# Patient Record
(demographics unavailable — no encounter records)

---

## 2024-12-17 NOTE — HISTORY OF PRESENT ILLNESS
[FreeTextEntry1] : 47F with PMH of T2DM on oral meds (A1c 6.3) and mild HTN here for CV eval for abnormal EKG and for preop cardiac eval for fibroid resection at [facility] on 1/6/2025. She denies any cp, no syncope, no palpitation and no dyspnea.   Cardiac medications= janumet, jardiance, vascepa  EKG= NSR with anterolateral TWIs  ECHO= TTE normal EF, mild MR, no regional WMAs  Stress test= EST reached 10.1 METS on modified joseph protocol, no chest pain, minimal nonspecific ST changes.  LHC= none  Recent hospitalization/ER visit= none  + family hx of T2DM  No significant social history  Assessment and Plan 1. Abnormal EKG   2. T2DM  3. Preop cardiac eval   -given her T2DM hx, abnormal EKG at baseline but no ischemic sxs and normal EF on TTE ECHO, pt is at a moderate risk for the planned procedure. Pt is optimized from a cardiac standpoint to proceed.   -EKG with TWI in the anterolateral leads, no cp, EST reached 10.1 METS w/o significant ST changes or sxs. would recommend nonurgent CCTA post surgery.  -LDL 55, A1c 6.3 on meds, cont risk factors management and f/u with PCP.   During non face-to-face time, I reviewed relevant portions of the patient's medical record. During face-to-face time, I took a relevant history and examined the patient. I also explained differential diagnoses, relevant cardiac diagnoses, workup, and management plan, which required a moderate level of medical decision making. I answered all questions related to the patient's medical conditions.   Brandi Song MD FACC Interventional Cardiology Attending [facility] Tel: [phone] Mobile: [phone] - [place] Go: [email or web address]

## 2024-12-17 NOTE — HISTORY OF PRESENT ILLNESS
[FreeTextEntry1] : 47F with PMH of T2DM on oral meds (A1c 6.3) and mild HTN here for CV eval for abnormal EKG and for preop cardiac eval for fibroid resection at [facility] on 1/6/2025. She denies any cp, no syncope, no palpitation and no dyspnea.   Cardiac medications= janumet, jardiance, vascepa  EKG= NSR with anterolateral TWIs  ECHO= TTE normal EF, mild MR, no regional WMAs  Stress test= EST reached 10.1 METS on modified joseph protocol, no chest pain, minimal nonspecific ST changes.  LHC= none  Recent hospitalization/ER visit= none  + family hx of T2DM  No significant social history  Assessment and Plan 1. Abnormal EKG   2. T2DM  3. Preop cardiac eval   -given her T2DM hx, abnormal EKG at baseline but no ischemic sxs and normal EF on TTE ECHO, pt is at a moderate risk for the planned procedure. Pt is optimized from a cardiac standpoint to proceed.   -EKG with TWI in the anterolateral leads, no cp, EST reached 10.1 METS w/o significant ST changes or sxs. would recommend nonurgent CCTA post surgery.  -LDL 55, A1c 6.3 on meds, cont risk factors management and f/u with PCP.   During non face-to-face time, I reviewed relevant portions of the patient's medical record. During face-to-face time, I took a relevant history and examined the patient. I also explained differential diagnoses, relevant cardiac diagnoses, workup, and management plan, which required a moderate level of medical decision making. I answered all questions related to the patient's medical conditions.   Brandi Song MD FACC Interventional Cardiology Attending [facility]/ Tel: [phone] Mobile: [phone] - [place] Go: [email or web address]